# Patient Record
Sex: FEMALE | Race: WHITE | NOT HISPANIC OR LATINO | Employment: FULL TIME | URBAN - METROPOLITAN AREA
[De-identification: names, ages, dates, MRNs, and addresses within clinical notes are randomized per-mention and may not be internally consistent; named-entity substitution may affect disease eponyms.]

---

## 2019-06-11 ENCOUNTER — OCCMED (OUTPATIENT)
Dept: LAB | Facility: CLINIC | Age: 44
End: 2019-06-11

## 2019-06-11 DIAGNOSIS — Z02.1 PHYSICAL EXAM, PRE-EMPLOYMENT: Primary | ICD-10-CM

## 2019-06-11 LAB — RUBV IGG SERPL IA-ACNC: 82.6 IU/ML

## 2019-06-11 PROCEDURE — 86735 MUMPS ANTIBODY: CPT

## 2019-06-11 PROCEDURE — 86765 RUBEOLA ANTIBODY: CPT

## 2019-06-11 PROCEDURE — 86787 VARICELLA-ZOSTER ANTIBODY: CPT

## 2019-06-11 PROCEDURE — 86706 HEP B SURFACE ANTIBODY: CPT

## 2019-06-11 PROCEDURE — 86480 TB TEST CELL IMMUN MEASURE: CPT

## 2019-06-11 PROCEDURE — 36415 COLL VENOUS BLD VENIPUNCTURE: CPT

## 2019-06-11 PROCEDURE — 86762 RUBELLA ANTIBODY: CPT

## 2019-06-12 LAB — HBV SURFACE AB SER-ACNC: 711.68 MIU/ML

## 2019-06-13 LAB
GAMMA INTERFERON BACKGROUND BLD IA-ACNC: 0.04 IU/ML
M TB IFN-G BLD-IMP: NEGATIVE
M TB IFN-G CD4+ BCKGRND COR BLD-ACNC: 0.01 IU/ML
M TB IFN-G CD4+ BCKGRND COR BLD-ACNC: 0.02 IU/ML
MEV IGG SER QL: NORMAL
MITOGEN IGNF BCKGRD COR BLD-ACNC: >10 IU/ML
MUV IGG SER QL: NORMAL
VZV IGG SER IA-ACNC: NORMAL

## 2020-09-02 ENCOUNTER — TRANSCRIBE ORDERS (OUTPATIENT)
Dept: ADMINISTRATIVE | Facility: HOSPITAL | Age: 45
End: 2020-09-02

## 2020-09-02 DIAGNOSIS — M54.2 NECKACHE: ICD-10-CM

## 2020-09-02 DIAGNOSIS — M54.2 CERVICALGIA: ICD-10-CM

## 2020-09-02 DIAGNOSIS — R20.2 PARESTHESIA: Primary | ICD-10-CM

## 2020-09-04 ENCOUNTER — HOSPITAL ENCOUNTER (OUTPATIENT)
Dept: NEUROLOGY | Facility: CLINIC | Age: 45
Discharge: HOME | End: 2020-09-04
Payer: COMMERCIAL

## 2020-09-04 ENCOUNTER — HOSPITAL ENCOUNTER (OUTPATIENT)
Dept: RADIOLOGY | Facility: HOSPITAL | Age: 45
Discharge: HOME | End: 2020-09-04
Payer: COMMERCIAL

## 2020-09-04 DIAGNOSIS — M54.2 CERVICALGIA: ICD-10-CM

## 2020-09-04 DIAGNOSIS — R20.2 PARESTHESIA: ICD-10-CM

## 2020-09-04 DIAGNOSIS — M54.2 NECKACHE: ICD-10-CM

## 2020-09-04 PROCEDURE — 95909 NRV CNDJ TST 5-6 STUDIES: CPT | Performed by: PSYCHIATRY & NEUROLOGY

## 2020-09-04 PROCEDURE — 72040 X-RAY EXAM NECK SPINE 2-3 VW: CPT

## 2020-09-04 PROCEDURE — 95886 MUSC TEST DONE W/N TEST COMP: CPT | Performed by: PSYCHIATRY & NEUROLOGY

## 2020-10-16 ENCOUNTER — LAB (OUTPATIENT)
Dept: LAB | Facility: HOSPITAL | Age: 45
End: 2020-10-16
Payer: COMMERCIAL

## 2020-10-16 ENCOUNTER — TRANSCRIBE ORDERS (OUTPATIENT)
Dept: ADMINISTRATIVE | Facility: HOSPITAL | Age: 45
End: 2020-10-16

## 2020-10-16 DIAGNOSIS — E55.9 VITAMIN D DEFICIENCY: ICD-10-CM

## 2020-10-16 DIAGNOSIS — Z13.29 SCREENING FOR HYPOTHYROIDISM: ICD-10-CM

## 2020-10-16 DIAGNOSIS — Z13.220 SCREENING CHOLESTEROL LEVEL: ICD-10-CM

## 2020-10-16 DIAGNOSIS — I10 ESSENTIAL HYPERTENSION: Primary | ICD-10-CM

## 2020-10-16 DIAGNOSIS — I10 ESSENTIAL HYPERTENSION: ICD-10-CM

## 2020-10-16 DIAGNOSIS — Z13.6 SCREENING FOR HYPERTENSION: ICD-10-CM

## 2020-10-16 LAB
25(OH)D3 SERPL-MCNC: 19.6 NG/ML (ref 30–100)
ALBUMIN SERPL BCP-MCNC: 3.5 G/DL (ref 3.5–5)
ALP SERPL-CCNC: 83 U/L (ref 46–116)
ALT SERPL W P-5'-P-CCNC: 41 U/L (ref 12–78)
ANION GAP SERPL CALCULATED.3IONS-SCNC: 8 MMOL/L (ref 4–13)
AST SERPL W P-5'-P-CCNC: 20 U/L (ref 5–45)
BASOPHILS # BLD AUTO: 0.08 THOUSANDS/ΜL (ref 0–0.1)
BASOPHILS NFR BLD AUTO: 1 % (ref 0–1)
BILIRUB SERPL-MCNC: 0.46 MG/DL (ref 0.2–1)
BUN SERPL-MCNC: 18 MG/DL (ref 5–25)
CALCIUM SERPL-MCNC: 9.1 MG/DL (ref 8.3–10.1)
CHLORIDE SERPL-SCNC: 103 MMOL/L (ref 100–108)
CHOLEST SERPL-MCNC: 209 MG/DL (ref 50–200)
CO2 SERPL-SCNC: 27 MMOL/L (ref 21–32)
CREAT SERPL-MCNC: 0.61 MG/DL (ref 0.6–1.3)
EOSINOPHIL # BLD AUTO: 0.08 THOUSAND/ΜL (ref 0–0.61)
EOSINOPHIL NFR BLD AUTO: 1 % (ref 0–6)
ERYTHROCYTE [DISTWIDTH] IN BLOOD BY AUTOMATED COUNT: 12.3 % (ref 11.6–15.1)
GFR SERPL CREATININE-BSD FRML MDRD: 110 ML/MIN/1.73SQ M
GLUCOSE P FAST SERPL-MCNC: 163 MG/DL (ref 65–99)
HCT VFR BLD AUTO: 41.8 % (ref 34.8–46.1)
HDLC SERPL-MCNC: 61 MG/DL
HGB BLD-MCNC: 13.3 G/DL (ref 11.5–15.4)
IMM GRANULOCYTES # BLD AUTO: 0.04 THOUSAND/UL (ref 0–0.2)
IMM GRANULOCYTES NFR BLD AUTO: 1 % (ref 0–2)
LDLC SERPL CALC-MCNC: 120 MG/DL (ref 0–100)
LYMPHOCYTES # BLD AUTO: 2.13 THOUSANDS/ΜL (ref 0.6–4.47)
LYMPHOCYTES NFR BLD AUTO: 25 % (ref 14–44)
MCH RBC QN AUTO: 27.8 PG (ref 26.8–34.3)
MCHC RBC AUTO-ENTMCNC: 31.8 G/DL (ref 31.4–37.4)
MCV RBC AUTO: 87 FL (ref 82–98)
MONOCYTES # BLD AUTO: 0.63 THOUSAND/ΜL (ref 0.17–1.22)
MONOCYTES NFR BLD AUTO: 8 % (ref 4–12)
NEUTROPHILS # BLD AUTO: 5.41 THOUSANDS/ΜL (ref 1.85–7.62)
NEUTS SEG NFR BLD AUTO: 64 % (ref 43–75)
NONHDLC SERPL-MCNC: 148 MG/DL
NRBC BLD AUTO-RTO: 0 /100 WBCS
PLATELET # BLD AUTO: 287 THOUSANDS/UL (ref 149–390)
PMV BLD AUTO: 11.1 FL (ref 8.9–12.7)
POTASSIUM SERPL-SCNC: 4.1 MMOL/L (ref 3.5–5.3)
PROT SERPL-MCNC: 7.5 G/DL (ref 6.4–8.2)
RBC # BLD AUTO: 4.78 MILLION/UL (ref 3.81–5.12)
SODIUM SERPL-SCNC: 138 MMOL/L (ref 136–145)
TRIGL SERPL-MCNC: 139 MG/DL
TSH SERPL DL<=0.05 MIU/L-ACNC: 1.07 UIU/ML (ref 0.36–3.74)
WBC # BLD AUTO: 8.37 THOUSAND/UL (ref 4.31–10.16)

## 2020-10-16 PROCEDURE — 85025 COMPLETE CBC W/AUTO DIFF WBC: CPT

## 2020-10-16 PROCEDURE — 80061 LIPID PANEL: CPT

## 2020-10-16 PROCEDURE — 80053 COMPREHEN METABOLIC PANEL: CPT

## 2020-10-16 PROCEDURE — 36415 COLL VENOUS BLD VENIPUNCTURE: CPT

## 2020-10-16 PROCEDURE — 82306 VITAMIN D 25 HYDROXY: CPT

## 2020-10-16 PROCEDURE — 84443 ASSAY THYROID STIM HORMONE: CPT

## 2020-12-28 ENCOUNTER — IMMUNIZATIONS (OUTPATIENT)
Dept: FAMILY MEDICINE CLINIC | Facility: HOSPITAL | Age: 45
End: 2020-12-28
Payer: COMMERCIAL

## 2020-12-28 DIAGNOSIS — Z23 ENCOUNTER FOR IMMUNIZATION: ICD-10-CM

## 2020-12-28 PROCEDURE — 0011A SARS-COV-2 / COVID-19 MRNA VACCINE (MODERNA) 100 MCG: CPT

## 2020-12-28 PROCEDURE — 91301 SARS-COV-2 / COVID-19 MRNA VACCINE (MODERNA) 100 MCG: CPT

## 2021-01-26 ENCOUNTER — IMMUNIZATIONS (OUTPATIENT)
Dept: FAMILY MEDICINE CLINIC | Facility: HOSPITAL | Age: 46
End: 2021-01-26

## 2021-01-26 DIAGNOSIS — Z23 ENCOUNTER FOR IMMUNIZATION: Primary | ICD-10-CM

## 2021-01-26 PROCEDURE — 0012A SARS-COV-2 / COVID-19 MRNA VACCINE (MODERNA) 100 MCG: CPT

## 2021-01-26 PROCEDURE — 91301 SARS-COV-2 / COVID-19 MRNA VACCINE (MODERNA) 100 MCG: CPT

## 2021-09-20 ENCOUNTER — APPOINTMENT (OUTPATIENT)
Dept: LAB | Facility: HOSPITAL | Age: 46
End: 2021-09-20
Payer: COMMERCIAL

## 2021-09-20 DIAGNOSIS — I10 ESSENTIAL HYPERTENSION, MALIGNANT: ICD-10-CM

## 2021-09-20 DIAGNOSIS — R73.9 BLOOD GLUCOSE ELEVATED: ICD-10-CM

## 2021-09-20 DIAGNOSIS — E55.9 AVITAMINOSIS D: ICD-10-CM

## 2021-09-20 DIAGNOSIS — E78.5 HYPERLIPIDEMIA, UNSPECIFIED HYPERLIPIDEMIA TYPE: ICD-10-CM

## 2021-09-20 DIAGNOSIS — R73.01 IMPAIRED FASTING GLUCOSE: ICD-10-CM

## 2021-09-20 LAB
25(OH)D3 SERPL-MCNC: 29.8 NG/ML (ref 30–100)
ALBUMIN SERPL BCP-MCNC: 3.5 G/DL (ref 3.5–5)
ALP SERPL-CCNC: 78 U/L (ref 46–116)
ALT SERPL W P-5'-P-CCNC: 44 U/L (ref 12–78)
ANION GAP SERPL CALCULATED.3IONS-SCNC: 12 MMOL/L (ref 4–13)
AST SERPL W P-5'-P-CCNC: 33 U/L (ref 5–45)
BASOPHILS # BLD AUTO: 0.06 THOUSANDS/ΜL (ref 0–0.1)
BASOPHILS NFR BLD AUTO: 1 % (ref 0–1)
BILIRUB SERPL-MCNC: 0.39 MG/DL (ref 0.2–1)
BUN SERPL-MCNC: 17 MG/DL (ref 5–25)
CALCIUM SERPL-MCNC: 8.6 MG/DL (ref 8.3–10.1)
CHLORIDE SERPL-SCNC: 103 MMOL/L (ref 100–108)
CHOLEST SERPL-MCNC: 217 MG/DL (ref 50–200)
CO2 SERPL-SCNC: 23 MMOL/L (ref 21–32)
CREAT SERPL-MCNC: 0.62 MG/DL (ref 0.6–1.3)
EOSINOPHIL # BLD AUTO: 0.05 THOUSAND/ΜL (ref 0–0.61)
EOSINOPHIL NFR BLD AUTO: 1 % (ref 0–6)
ERYTHROCYTE [DISTWIDTH] IN BLOOD BY AUTOMATED COUNT: 12.6 % (ref 11.6–15.1)
EST. AVERAGE GLUCOSE BLD GHB EST-MCNC: 157 MG/DL
GFR SERPL CREATININE-BSD FRML MDRD: 108 ML/MIN/1.73SQ M
GLUCOSE P FAST SERPL-MCNC: 177 MG/DL (ref 65–99)
HBA1C MFR BLD: 7.1 %
HCT VFR BLD AUTO: 41.6 % (ref 34.8–46.1)
HDLC SERPL-MCNC: 55 MG/DL
HGB BLD-MCNC: 13.8 G/DL (ref 11.5–15.4)
IMM GRANULOCYTES # BLD AUTO: 0.01 THOUSAND/UL (ref 0–0.2)
IMM GRANULOCYTES NFR BLD AUTO: 0 % (ref 0–2)
LDLC SERPL CALC-MCNC: 135 MG/DL (ref 0–100)
LYMPHOCYTES # BLD AUTO: 1.81 THOUSANDS/ΜL (ref 0.6–4.47)
LYMPHOCYTES NFR BLD AUTO: 25 % (ref 14–44)
MCH RBC QN AUTO: 28.4 PG (ref 26.8–34.3)
MCHC RBC AUTO-ENTMCNC: 33.2 G/DL (ref 31.4–37.4)
MCV RBC AUTO: 86 FL (ref 82–98)
MONOCYTES # BLD AUTO: 0.52 THOUSAND/ΜL (ref 0.17–1.22)
MONOCYTES NFR BLD AUTO: 7 % (ref 4–12)
NEUTROPHILS # BLD AUTO: 4.69 THOUSANDS/ΜL (ref 1.85–7.62)
NEUTS SEG NFR BLD AUTO: 66 % (ref 43–75)
NONHDLC SERPL-MCNC: 162 MG/DL
NRBC BLD AUTO-RTO: 0 /100 WBCS
PLATELET # BLD AUTO: 257 THOUSANDS/UL (ref 149–390)
PMV BLD AUTO: 11.5 FL (ref 8.9–12.7)
POTASSIUM SERPL-SCNC: 4.1 MMOL/L (ref 3.5–5.3)
PROT SERPL-MCNC: 6.9 G/DL (ref 6.4–8.2)
RBC # BLD AUTO: 4.86 MILLION/UL (ref 3.81–5.12)
SODIUM SERPL-SCNC: 138 MMOL/L (ref 136–145)
TRIGL SERPL-MCNC: 134 MG/DL
WBC # BLD AUTO: 7.14 THOUSAND/UL (ref 4.31–10.16)

## 2021-09-20 PROCEDURE — 83036 HEMOGLOBIN GLYCOSYLATED A1C: CPT

## 2021-09-20 PROCEDURE — 80053 COMPREHEN METABOLIC PANEL: CPT

## 2021-09-20 PROCEDURE — 85025 COMPLETE CBC W/AUTO DIFF WBC: CPT

## 2021-09-20 PROCEDURE — 80061 LIPID PANEL: CPT

## 2021-09-20 PROCEDURE — 82306 VITAMIN D 25 HYDROXY: CPT

## 2021-09-20 PROCEDURE — 36415 COLL VENOUS BLD VENIPUNCTURE: CPT

## 2021-09-22 ENCOUNTER — HOSPITAL ENCOUNTER (OUTPATIENT)
Dept: CT IMAGING | Facility: HOSPITAL | Age: 46
Discharge: HOME | End: 2021-09-22
Payer: COMMERCIAL

## 2021-09-22 DIAGNOSIS — R91.1 SOLITARY PULMONARY NODULE: ICD-10-CM

## 2021-09-22 PROCEDURE — 71250 CT THORAX DX C-: CPT

## 2021-09-22 PROCEDURE — G1004 CDSM NDSC: HCPCS

## 2021-11-16 ENCOUNTER — IMMUNIZATIONS (OUTPATIENT)
Dept: FAMILY MEDICINE CLINIC | Facility: HOSPITAL | Age: 46
End: 2021-11-16

## 2021-11-16 DIAGNOSIS — Z23 ENCOUNTER FOR IMMUNIZATION: Primary | ICD-10-CM

## 2021-11-16 PROCEDURE — 91306 COVID-19 MODERNA VACC 0.25 ML BOOSTER: CPT

## 2021-11-16 PROCEDURE — 0064A COVID-19 MODERNA VACC 0.25 ML BOOSTER: CPT

## 2021-11-23 ENCOUNTER — OFFICE VISIT (OUTPATIENT)
Dept: PULMONOLOGY | Facility: CLINIC | Age: 46
End: 2021-11-23
Payer: COMMERCIAL

## 2021-11-23 VITALS
OXYGEN SATURATION: 96 % | BODY MASS INDEX: 39.09 KG/M2 | SYSTOLIC BLOOD PRESSURE: 124 MMHG | HEIGHT: 64 IN | WEIGHT: 229 LBS | HEART RATE: 84 BPM | DIASTOLIC BLOOD PRESSURE: 82 MMHG

## 2021-11-23 DIAGNOSIS — E66.9 OBESITY (BMI 30-39.9): ICD-10-CM

## 2021-11-23 DIAGNOSIS — R91.8 PULMONARY NODULES: Primary | ICD-10-CM

## 2021-11-23 DIAGNOSIS — G47.33 OSA (OBSTRUCTIVE SLEEP APNEA): ICD-10-CM

## 2021-11-23 PROCEDURE — 99244 OFF/OP CNSLTJ NEW/EST MOD 40: CPT | Performed by: INTERNAL MEDICINE

## 2021-11-23 RX ORDER — CITALOPRAM 20 MG/1
TABLET ORAL
COMMUNITY
Start: 2021-11-01

## 2021-11-23 RX ORDER — ESOMEPRAZOLE MAGNESIUM 40 MG/1
CAPSULE, DELAYED RELEASE ORAL
COMMUNITY
Start: 2021-09-07

## 2021-11-23 RX ORDER — LORAZEPAM 0.5 MG/1
TABLET ORAL
COMMUNITY
Start: 2021-09-27

## 2021-11-23 RX ORDER — LEVONORGESTREL AND ETHINYL ESTRADIOL 0.15-0.03
KIT ORAL
COMMUNITY
Start: 2021-11-01

## 2021-11-23 RX ORDER — CETIRIZINE HYDROCHLORIDE 10 MG/1
10 TABLET ORAL
COMMUNITY

## 2022-09-19 ENCOUNTER — OFFICE VISIT (OUTPATIENT)
Dept: BARIATRICS | Facility: CLINIC | Age: 47
End: 2022-09-19
Payer: COMMERCIAL

## 2022-09-19 VITALS
HEIGHT: 64 IN | HEART RATE: 90 BPM | SYSTOLIC BLOOD PRESSURE: 122 MMHG | DIASTOLIC BLOOD PRESSURE: 78 MMHG | WEIGHT: 225 LBS | BODY MASS INDEX: 38.41 KG/M2 | OXYGEN SATURATION: 98 % | TEMPERATURE: 97.3 F

## 2022-09-19 DIAGNOSIS — E66.9 DIABETES MELLITUS TYPE 2 IN OBESE (HCC): ICD-10-CM

## 2022-09-19 DIAGNOSIS — E78.00 ELEVATED CHOLESTEROL: ICD-10-CM

## 2022-09-19 DIAGNOSIS — E66.01 SEVERE OBESITY (BMI 35.0-39.9) WITH COMORBIDITY (HCC): Primary | ICD-10-CM

## 2022-09-19 DIAGNOSIS — E55.9 VITAMIN D DEFICIENCY: ICD-10-CM

## 2022-09-19 DIAGNOSIS — E11.69 DIABETES MELLITUS TYPE 2 IN OBESE (HCC): ICD-10-CM

## 2022-09-19 PROCEDURE — 99244 OFF/OP CNSLTJ NEW/EST MOD 40: CPT | Performed by: PHYSICIAN ASSISTANT

## 2022-09-19 NOTE — PATIENT INSTRUCTIONS
Goals: Food log (ie ) www myfitnesspal com,sparkpeople  com,loseit com,calorieking  com,etc  baritastic  No sugary beverages  At least 64oz of water daily  Increase physical activity by 10 minutes daily   Gradually increase physical activity to a goal of 5 days per week for 30 minutes of MODERATE intensity PLUS 2 days per week of FULL BODY resistance training  5-10 servings of fruits and vegetables per day and 25-35 grams of dietary fiber per day, gradually increasing  Insight timer minoo  Recommend checking lab/EKG coverage before having labs drawn

## 2022-09-19 NOTE — ASSESSMENT & PLAN NOTE
-Discussed options of HealthyCORE-Intensive Lifestyle Intervention Program, Very Low Calorie Diet-VLCD, Conservative Program, Maicol-En-Y Gastric Bypass and Vertical Sleeve Gastrectomy and the role of weight loss medications   -Not interested in bariatric surgery  -Initial weight loss goal of 5-10% weight loss for improved health  -Screening labs: update  -Patient is interested in pursuing Very Low Calorie Diet-VLCD     Contraindications: no  Labs ordered: yes  HTN meds addressed: no  DM2 meds addressed: no  VLCD time restriction based on BMI: no  LMP/OCP: on OCP  Sample meal replacements provided

## 2022-09-19 NOTE — PROGRESS NOTES
Assessment/Plan:    Severe obesity (BMI 35 0-39  9) with comorbidity ([facility] )  -Discussed options of HealthyCORE-Intensive Lifestyle Intervention Program, Very Low Calorie Diet-VLCD, Conservative Program, Maicol-En-Y Gastric Bypass and Vertical Sleeve Gastrectomy and the role of weight loss medications   -Not interested in bariatric surgery  -Initial weight loss goal of 5-10% weight loss for improved health  -Screening labs: update  -Patient is interested in pursuing Very Low Calorie Diet-VLCD     Contraindications: no  Labs ordered: yes  HTN meds addressed: no  DM2 meds addressed: no  VLCD time restriction based on BMI: no  LMP/OCP: on OCP  Sample meal replacements provided    Follow up for VLCD  Work on increasing water    Goals:  Food log (ie ) www myfitnesspal com,sparkpeople  com,loseit com,calorieking  com,etc  baritastic  No sugary beverages  At least 64oz of water daily  Increase physical activity by 10 minutes daily  Gradually increase physical activity to a goal of 5 days per week for 30 minutes of MODERATE intensity PLUS 2 days per week of FULL BODY resistance training  5-10 servings of fruits and vegetables per day and 25-35 grams of dietary fiber per day, gradually increasing  Insight timer minoo  Recommend checking lab/EKG coverage before having labs drawn    Diagnoses and all orders for this visit:    Severe obesity (BMI 35 0-39  9) with comorbidity ([facility] )  Comments:  will check CBC as usually ordered with annual labs- she will f/u with PCP if abnormal  Orders:  -     Comprehensive metabolic panel; Future  -     Hemoglobin A1C; Future  -     Vitamin D 25 hydroxy; Future  -     TSH, 3rd generation with Free T4 reflex; Future  -     CBC and differential; Future  -     Magnesium; Future  -     ECG 12 lead; Future    Vitamin D deficiency  -     Comprehensive metabolic panel; Future  -     Hemoglobin A1C; Future  -     Vitamin D 25 hydroxy; Future  -     TSH, 3rd generation with Free T4 reflex;  Future  -     CBC and differential; Future  -     Magnesium; Future  -     ECG 12 lead; Future    Diabetes mellitus type 2 in obese [facility])  -     Comprehensive metabolic panel; Future  -     Hemoglobin A1C; Future  -     Vitamin D 25 hydroxy; Future  -     TSH, 3rd generation with Free T4 reflex; Future  -     CBC and differential; Future  -     Magnesium; Future  -     ECG 12 lead; Future    Elevated cholesterol  -     Comprehensive metabolic panel; Future  -     Hemoglobin A1C; Future  -     Vitamin D 25 hydroxy; Future  -     TSH, 3rd generation with Free T4 reflex; Future  -     CBC and differential; Future  -     Magnesium; Future  -     ECG 12 lead; Future        Subjective:   Chief Complaint   Patient presents with    Consult     S/B already uses cpap machine  Patient ID: Tayo Marie  is a 51 y o  female with excess weight/obesity here to pursue weight management  Past Medical History:   Diagnosis Date    Acid reflux     Depression     Sleep apnea      HPI:  Obesity/Excess Weight:  Severity: Severe  Onset:  Over ten years    Modifiers: Diet and Exercise and [employer]- worked until stopped   Prescribed meds, but never started: Wellbutrin/naltrexone, Phentermine  Contributing factors: Insufficient Physical Activity and Depression  Associated symptoms: comorbid conditions and decreased self esteem    Goals: 155-175  Hydration: not reaching hydration goals (1 bottle of water per day), sometimes SF ice tea; 2 cups of coffee with creamer  Alcohol: once a month or less  Exercise: denies  Occupation: RN case manager for network    The following portions of the patient's history were reviewed and updated as appropriate: allergies, current medications, past family history, past medical history, past social history, past surgical history and problem list     Review of Systems   Constitutional: Negative for chills and fever  HENT: Negative for sore throat  Respiratory: Negative for cough and shortness of breath  Cardiovascular: Negative for chest pain and palpitations  Gastrointestinal: Negative for constipation and diarrhea  Genitourinary: Negative for dysuria  Musculoskeletal: Negative for arthralgias  Skin: Negative for rash  Neurological: Positive for headaches (tension headaches; recommend eval if sx persist or worsen; sleep also an issue- may be perimenopausal related)  Psychiatric/Behavioral:        +depression- doing well with Celexa; Denies SI/HI     Objective:    /78 (BP Location: Left arm, Cuff Size: Large)   Pulse 90   Temp (!) 97 3 °F (36 3 °C) (Temporal)   Ht 5' 4" (1 626 m)   Wt 102 kg (225 lb)   SpO2 98%   BMI 38 62 kg/m²     Physical Exam  Vitals and nursing note reviewed  Constitutional   General appearance: Abnormal   well developed and obese  Eyes No conjunctival pallor  Ears, Nose, Mouth, and Throat Oral mucosa moist    Pulmonary   Respiratory effort: No increased work of breathing or signs of respiratory distress  Auscultation of lungs: Clear to auscultation, equal breath sounds bilaterally, no wheezes, no rales, no rhonci  Cardiovascular   Auscultation of heart: Normal rate and rhythm, normal S1 and S2, without murmurs  Examination of extremities for edema and/or varicosities: Normal   no edema  Abdomen   Abdomen: Abnormal   The abdomen was obese  Bowel sounds were normal  The abdomen was soft and nontender     Musculoskeletal   Gait and station: Normal     Psychiatric   Orientation to person, place and time: Normal     Affect: appropriate

## 2022-09-21 ENCOUNTER — APPOINTMENT (OUTPATIENT)
Dept: LAB | Facility: HOSPITAL | Age: 47
End: 2022-09-21
Payer: COMMERCIAL

## 2022-09-21 DIAGNOSIS — E66.01 SEVERE OBESITY (BMI 35.0-39.9) WITH COMORBIDITY (HCC): ICD-10-CM

## 2022-09-21 DIAGNOSIS — E66.9 DIABETES MELLITUS TYPE 2 IN OBESE (HCC): ICD-10-CM

## 2022-09-21 DIAGNOSIS — E78.00 ELEVATED CHOLESTEROL: ICD-10-CM

## 2022-09-21 DIAGNOSIS — E11.69 DIABETES MELLITUS TYPE 2 IN OBESE (HCC): ICD-10-CM

## 2022-09-21 DIAGNOSIS — E55.9 VITAMIN D DEFICIENCY: ICD-10-CM

## 2022-09-21 LAB
25(OH)D3 SERPL-MCNC: 22.3 NG/ML (ref 30–100)
ALBUMIN SERPL BCP-MCNC: 3.5 G/DL (ref 3.5–5)
ALP SERPL-CCNC: 77 U/L (ref 46–116)
ALT SERPL W P-5'-P-CCNC: 32 U/L (ref 12–78)
ANION GAP SERPL CALCULATED.3IONS-SCNC: 8 MMOL/L (ref 4–13)
AST SERPL W P-5'-P-CCNC: 29 U/L (ref 5–45)
BASOPHILS # BLD AUTO: 0.07 THOUSANDS/ΜL (ref 0–0.1)
BASOPHILS NFR BLD AUTO: 1 % (ref 0–1)
BILIRUB SERPL-MCNC: 0.33 MG/DL (ref 0.2–1)
BUN SERPL-MCNC: 21 MG/DL (ref 5–25)
CALCIUM SERPL-MCNC: 9 MG/DL (ref 8.3–10.1)
CHLORIDE SERPL-SCNC: 102 MMOL/L (ref 96–108)
CO2 SERPL-SCNC: 27 MMOL/L (ref 21–32)
CREAT SERPL-MCNC: 0.68 MG/DL (ref 0.6–1.3)
EOSINOPHIL # BLD AUTO: 0.09 THOUSAND/ΜL (ref 0–0.61)
EOSINOPHIL NFR BLD AUTO: 1 % (ref 0–6)
ERYTHROCYTE [DISTWIDTH] IN BLOOD BY AUTOMATED COUNT: 12.7 % (ref 11.6–15.1)
EST. AVERAGE GLUCOSE BLD GHB EST-MCNC: 166 MG/DL
GFR SERPL CREATININE-BSD FRML MDRD: 104 ML/MIN/1.73SQ M
GLUCOSE P FAST SERPL-MCNC: 188 MG/DL (ref 65–99)
HBA1C MFR BLD: 7.4 %
HCT VFR BLD AUTO: 41.5 % (ref 34.8–46.1)
HGB BLD-MCNC: 13.7 G/DL (ref 11.5–15.4)
IMM GRANULOCYTES # BLD AUTO: 0.02 THOUSAND/UL (ref 0–0.2)
IMM GRANULOCYTES NFR BLD AUTO: 0 % (ref 0–2)
LYMPHOCYTES # BLD AUTO: 2.19 THOUSANDS/ΜL (ref 0.6–4.47)
LYMPHOCYTES NFR BLD AUTO: 26 % (ref 14–44)
MAGNESIUM SERPL-MCNC: 1.7 MG/DL (ref 1.6–2.6)
MCH RBC QN AUTO: 28 PG (ref 26.8–34.3)
MCHC RBC AUTO-ENTMCNC: 33 G/DL (ref 31.4–37.4)
MCV RBC AUTO: 85 FL (ref 82–98)
MONOCYTES # BLD AUTO: 0.56 THOUSAND/ΜL (ref 0.17–1.22)
MONOCYTES NFR BLD AUTO: 7 % (ref 4–12)
NEUTROPHILS # BLD AUTO: 5.55 THOUSANDS/ΜL (ref 1.85–7.62)
NEUTS SEG NFR BLD AUTO: 65 % (ref 43–75)
NRBC BLD AUTO-RTO: 0 /100 WBCS
PLATELET # BLD AUTO: 278 THOUSANDS/UL (ref 149–390)
PMV BLD AUTO: 11.3 FL (ref 8.9–12.7)
POTASSIUM SERPL-SCNC: 3.9 MMOL/L (ref 3.5–5.3)
PROT SERPL-MCNC: 7.7 G/DL (ref 6.4–8.4)
RBC # BLD AUTO: 4.9 MILLION/UL (ref 3.81–5.12)
SODIUM SERPL-SCNC: 137 MMOL/L (ref 135–147)
TSH SERPL DL<=0.05 MIU/L-ACNC: 3 UIU/ML (ref 0.45–4.5)
WBC # BLD AUTO: 8.48 THOUSAND/UL (ref 4.31–10.16)

## 2022-09-21 PROCEDURE — 80053 COMPREHEN METABOLIC PANEL: CPT

## 2022-09-21 PROCEDURE — 84443 ASSAY THYROID STIM HORMONE: CPT

## 2022-09-21 PROCEDURE — 83735 ASSAY OF MAGNESIUM: CPT

## 2022-09-21 PROCEDURE — 82306 VITAMIN D 25 HYDROXY: CPT

## 2022-09-21 PROCEDURE — 85025 COMPLETE CBC W/AUTO DIFF WBC: CPT

## 2022-09-21 PROCEDURE — 36415 COLL VENOUS BLD VENIPUNCTURE: CPT

## 2022-09-21 PROCEDURE — 83036 HEMOGLOBIN GLYCOSYLATED A1C: CPT

## 2022-09-26 DIAGNOSIS — E66.01 SEVERE OBESITY (BMI 35.0-39.9) WITH COMORBIDITY (HCC): ICD-10-CM

## 2022-09-26 DIAGNOSIS — E55.9 VITAMIN D DEFICIENCY: Primary | ICD-10-CM

## 2022-09-26 RX ORDER — ERGOCALCIFEROL 1.25 MG/1
50000 CAPSULE ORAL WEEKLY
Qty: 8 CAPSULE | Refills: 0 | Status: SHIPPED | OUTPATIENT
Start: 2022-09-26

## 2022-10-20 ENCOUNTER — OFFICE VISIT (OUTPATIENT)
Dept: FAMILY MEDICINE CLINIC | Facility: CLINIC | Age: 47
End: 2022-10-20
Payer: COMMERCIAL

## 2022-10-20 VITALS
TEMPERATURE: 98.3 F | HEART RATE: 92 BPM | BODY MASS INDEX: 38.47 KG/M2 | SYSTOLIC BLOOD PRESSURE: 138 MMHG | DIASTOLIC BLOOD PRESSURE: 90 MMHG | OXYGEN SATURATION: 98 % | HEIGHT: 64 IN | WEIGHT: 225.31 LBS

## 2022-10-20 DIAGNOSIS — F32.5 MAJOR DEPRESSIVE DISORDER WITH SINGLE EPISODE, IN FULL REMISSION (HCC): ICD-10-CM

## 2022-10-20 DIAGNOSIS — F51.01 PRIMARY INSOMNIA: ICD-10-CM

## 2022-10-20 DIAGNOSIS — M54.2 MUSCLE PAIN, CERVICAL: ICD-10-CM

## 2022-10-20 DIAGNOSIS — M70.61 TROCHANTERIC BURSITIS, RIGHT HIP: ICD-10-CM

## 2022-10-20 DIAGNOSIS — R03.0 ELEVATED BLOOD PRESSURE READING: ICD-10-CM

## 2022-10-20 DIAGNOSIS — E11.9 DM TYPE 2, GOAL HBA1C < 7% (HCC): Primary | ICD-10-CM

## 2022-10-20 PROCEDURE — 99205 OFFICE O/P NEW HI 60 MIN: CPT | Performed by: PHYSICIAN ASSISTANT

## 2022-10-20 RX ORDER — METFORMIN HYDROCHLORIDE 500 MG/1
500 TABLET, EXTENDED RELEASE ORAL
Qty: 30 TABLET | Refills: 3 | Status: SHIPPED | OUTPATIENT
Start: 2022-10-20 | End: 2023-02-17

## 2022-10-20 NOTE — PROGRESS NOTES
Assessment/Plan:       1  DM type 2, goal HbA1c < 7% ([facility])  -     metFORMIN (GLUCOPHAGE-XR) 500 mg 24 hr tablet; Take 1 tablet (500 mg total) by mouth daily with breakfast  -     HEMOGLOBIN A1C W/ EAG ESTIMATION; Future    2  BMI 38 0-38 9,adult    3  Elevated blood pressure reading    4  Major depressive disorder with single episode, in full remission ([address] )    5  Primary insomnia    6  Muscle pain, cervical    7  Trochanteric bursitis, right hip      This 19-year-old female is establishing care here possible primary care  She works as a  registered nurse at the [employer]   She previously worked as a floor nurse at [employer]   During her for nursing duties, she did have a lot of lifting responsibilities but no actual injury and no history of motor vehicle accident  Patient is complaining of right shoulder pain for several years  Over the last 1 year  , she has had radicular pain going down the right upper extremity  Patient is left-hand dominant  She is also complaining of right hip pain along the lateral hip going into the lateral right thigh  She has no significant injury in that area  Patient would like to lose weight  She has seen in the bariatric specialist and she will be following a very low-calorie diet after she meets with a nutritionist   Patient states that her most recent hemoglobin A1c was 7% which puts her in the type 2 diabetes and she is willing to try metformin extended release once per day  We did discuss the patient's elevated BMI  BMI is above normal  Nutrition recommendations include reducing portion sizes and decreasing overall calorie intake  patient will also continue to have increased physical activity to promote maintaining her basal metabolic rate  A total of 60 minutes was spent rendering care for this patient    This time included review of the patient's electronic medical record, performing the history and physical, reviewing appropriate labs and/or images, developing a treatment and assessment plan, answering patient's questions and concerns, and documenting the patient visit  I will see the patient in 4 months and she will have a hemoglobin A1c prior to that visit  Subjective:      Patient ID: Issac Baker is a 51 y o  female  HPI:  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  She has a prescription to have a mammogram performed from her gynecologist and promises me that she will have this done in the near future  Patient is not having any pain in her chest heart palpitations dizziness or lightheadedness  Her primary complaints are related her right shoulder right neck right upper back and right hip pain  She had no overt injury to any of the sites  She is having radicular pain into the right upper extremity right forearm and right hand area over the last 1 year  Patient also has pain from the right hip into the right lateral thigh but no saddle anesthesia, changes in her bowel or bladder habits, footdrop, muscle weakness or falls  Patient wears glasses for distance  Her blood pressure is elevated today and she said that her blood pressure typically is not on the high range  She is going to keep a track of her blood pressure readings and will send them to me to see if treatment is needed  Neither parent has had hypertension  Patient is compliant with her vitamin-D supplementation as she was found to have a low vitamin-D level at 22 3  She continues to meet with the bariatric specialists and will be meeting with a nutritionist to plan a very low-calorie diet  We talked about the role of medications if she is not able to lose weight  She states that her ultimate goal would be 175 lb that she would love to get to 155   She understands the role of slow weight loss about 1 lb per week to be ideal but she may lose more than this if following a very low-calorie diet       The following portions of the patient's history were reviewed and updated as appropriate: allergies, current medications, past family history, past medical history, past social history, past surgical history, and problem list     Review of Systems  :  Patient denies any recent illness  She does not have pain in her chest when she is moving about  She denies any dyspnea upon exertion  No palpitations  No black or bloody stools  No difficulty passing her water  She continues to have her menses is withdrawal bleeding as she is on oral contraceptive  There is no reason that she cannot continue to take this until the time of her natural menopause which is expected at age 45  Patient is not complaining of any headaches  Vision is stable  She was never told she had any funduscopic abnormalities on her eyes in the past     Objective:      /90 (BP Location: Right arm, Patient Position: Sitting)   Pulse 92   Temp 98 3 °F (36 8 °C) (Tympanic)   Ht 5' 4" (1 626 m)   Wt 102 kg (225 lb 5 oz)   SpO2 98%   BMI 38 67 kg/m²          Physical Exam  Well-developed well-nourished 51y o  year old female who is cooperative with the exam   Patient is alert and oriented x3  Patient is appropriate in answering all questions  HEENT:  Normocephalic  PERRLA  EOMs intact  Funduscopic exam:  She has signs of sober wiring in the arterials on both eyes  This could be a sign of some hypertension or a normal variant  TMs are clear with identification of bony landmarks  No tragus or pinnae tenderness  No pre or posterior auricular adenopathy  Sinuses without tenderness  Throat without hyperemia  Neck:  Supple without adenopathy  Thyroid midline without thyromegaly or bruits  No carotid bruits  Chest symmetric and nontender  Heart regular rate and rhythm  No murmur rubs or gallops  Point of maximum impulse not displaced  Lungs are clear to auscultation  Breathing is nonlabored    Aerating bases well   Abdomen round and soft positive bowel sounds without masses tenderness or organomegaly  Extremities reveal adequate peripheral pulses without peripheral edema  Back exam:  Patient has significant muscle spasm and tightness to her muscles in the trapezius muscles on both sides right and left  This muscle tension is very significant and I think would respond quite well to massage therapy  Examination of the upper extremity muscle strength showed no focal deficits

## 2022-10-20 NOTE — PATIENT INSTRUCTIONS
Patient is here to establish care  Patient is concerned over right shoulder and neck pain without significant injury  The pain is on the right side and she is left-hand dominant  Patient has a lot of muscle spasm in this area and I suggested that massage therapy would be advantageous with this little is 1 massage every 2 weeks I think would contain the amount of spasm that she has  She also has some right trochanteric bursitis and I was able to do a mild stretch today to see if she has any effect  If the bursitis pain with radiation to the right lateral thigh persists, we can send her to physical therapy but will see which she has in response to putting a pillow between her knees for sleeping and doing that nice gentle stretch if she has someone to help her  Patient's blood pressure was slightly elevated today at 138/90  I did notice some silver wiring on her arterials on funduscopic exam   She is going to take her blood pressure and send me the results so that we could start treatment if necessary  She would not need another appointment I could just start her on the medication when I receive those values  Patient also has a hemoglobin A1c of 7 and she is willing to start metformin 500 mg daily for controlling her blood sugar and lowering her gluconeogenesis  We can check the patient in 4 months and do a hemoglobin A1c prior to that next visit  Patient is still having her menstrual cycles and is on oral contraceptives which she can continue to take all the way through menopause  Patient has major depression seems to be doing quite well with citalopram   She does have insomnia and is taking lorazepam at night as needed I think when she gets more active and starts to have some weight loss success that her sleeping will be improved  The sleeping will also be improved if we can get rid of the spasm in her trapezius muscles in her upper back area

## 2022-10-21 ENCOUNTER — OFFICE VISIT (OUTPATIENT)
Dept: LAB | Facility: HOSPITAL | Age: 47
End: 2022-10-21
Payer: COMMERCIAL

## 2022-10-21 DIAGNOSIS — E78.00 ELEVATED CHOLESTEROL: ICD-10-CM

## 2022-10-21 DIAGNOSIS — E66.9 DIABETES MELLITUS TYPE 2 IN OBESE (HCC): ICD-10-CM

## 2022-10-21 DIAGNOSIS — E11.69 DIABETES MELLITUS TYPE 2 IN OBESE (HCC): ICD-10-CM

## 2022-10-21 DIAGNOSIS — E55.9 VITAMIN D DEFICIENCY: ICD-10-CM

## 2022-10-21 DIAGNOSIS — E66.01 SEVERE OBESITY (BMI 35.0-39.9) WITH COMORBIDITY (HCC): ICD-10-CM

## 2022-10-21 LAB
ATRIAL RATE: 84 BPM
P AXIS: 64 DEGREES
PR INTERVAL: 132 MS
QRS AXIS: 52 DEGREES
QRSD INTERVAL: 78 MS
QT INTERVAL: 372 MS
QTC INTERVAL: 439 MS
T WAVE AXIS: 62 DEGREES
VENTRICULAR RATE: 84 BPM

## 2022-10-21 PROCEDURE — 93005 ELECTROCARDIOGRAM TRACING: CPT

## 2022-10-24 ENCOUNTER — TELEPHONE (OUTPATIENT)
Dept: SLEEP CENTER | Facility: CLINIC | Age: 47
End: 2022-10-24

## 2022-10-24 NOTE — TELEPHONE ENCOUNTER
I called and left message for the patient to call the office back regarding her ekg results  ----- Message from Tmimy Lopez PA-C sent at 10/24/2022  7:05 AM EDT -----  Regarding: Results  Please inform pt EKG within acceptable limits  She can be scheduled for VLCD   Thank you    ----- Message -----  From: Yunior Jorge  Sent: 10/21/2022   9:08 AM EDT  To: Timmy Lopez PA-C  Subject: Results                                          Just wanted to let u know i had my ekg done today  Hopefully after u review, i can get set up with the dietitian  Thanks

## 2022-11-02 NOTE — TELEPHONE ENCOUNTER
I called and left message for the patient to call the office back regarding about getting set up for the dietican for VLCD

## 2022-11-17 NOTE — PROGRESS NOTES
Initial RD session for VLCD completed  Kerry weighed 225# 9/19/22, 224# today  Components of diet discussed including ketosis, hydration, possible side effects  She will start VLCD 12/12/22  Set up patient [employer] account today for Kerry  She will order her own product; she is aware she is to order 6 boxes of meal replacements (2 with fiber) and 2 boxes of bars  She has RD contact information in case of questions  Will f/u via 12/15/22

## 2022-11-21 ENCOUNTER — OFFICE VISIT (OUTPATIENT)
Dept: BARIATRICS | Facility: CLINIC | Age: 47
End: 2022-11-21

## 2022-11-21 VITALS
BODY MASS INDEX: 38.31 KG/M2 | WEIGHT: 224.4 LBS | HEART RATE: 99 BPM | OXYGEN SATURATION: 96 % | SYSTOLIC BLOOD PRESSURE: 102 MMHG | TEMPERATURE: 97.5 F | DIASTOLIC BLOOD PRESSURE: 70 MMHG | HEIGHT: 64 IN

## 2022-11-21 DIAGNOSIS — Z78.9 KETOGENIC DIET: ICD-10-CM

## 2022-11-21 DIAGNOSIS — E66.9 DIABETES MELLITUS TYPE 2 IN OBESE (HCC): ICD-10-CM

## 2022-11-21 DIAGNOSIS — E11.69 DIABETES MELLITUS TYPE 2 IN OBESE (HCC): ICD-10-CM

## 2022-11-21 DIAGNOSIS — E66.9 CLASS 2 OBESITY: Primary | ICD-10-CM

## 2022-11-21 DIAGNOSIS — E66.09 CLASS 2 OBESITY DUE TO EXCESS CALORIES WITHOUT SERIOUS COMORBIDITY WITH BODY MASS INDEX (BMI) OF 38.0 TO 38.9 IN ADULT: ICD-10-CM

## 2022-11-21 LAB — SL AMB POCT URINE HCG: NEGATIVE

## 2022-11-21 NOTE — PROGRESS NOTES
Assessment/Plan:    Class 2 obesity  -Patient is pursuing Conservative Program  -Initial weight loss goal of 5-10% weight loss for improved health  -Not interested in bariatric surgery  -Denies hx of CAD, PAD, glaucoma, palpitations or arrhythmia   -Denies glaucoma, kidney stones, nephrolithiasis  -Patient denies personal and family history of MEN2 tumors and medullary thyroid/thyroid carcinoma  Patient denies personal history of pancreatitis  -Previously was on phentermine, had trouble sleeping  -Has interest in AOM, particularly Saxenda, which may be an option following VLCD  -Discussed role of AOM, mechanism of each, possible SE  Discussed transition from VLCD and that this may be a potential option at that time  She will check coverage  -EKG from 10/21/22: Normal sinus rhythm  Normal ECG  No previous ECGs available  -Screening labs: Mg, TSH, vitamin D, CBC, A1c, CMP reviewed from 9/21/22    Contraindications: no  Labs ordered: yes and within acceptable limits to start VLCD  HTN meds addressed: yes, n/a  DM2 meds addressed: hold metformin with VLCD  VLCD time restriction based on BMI: no  LMP/OCP: yes, on OCP, urine HCG negative    Initial: 225  Current: 224 6  Change: -0 4  Goal: 155-175    Follow up in approximately 2 months with Non-Surgical Physician/Advanced Practitioner  Goals:  Food log (ie ) www myfitnesspal com,sparkpeople  com,loseit com,calorieking  com,etc  baritastic  No sugary beverages  At least 64oz of water daily  Check coverage of Wegovy, Saxenda, Contrave, and Qsymia  Next week start over the counter vitamin D 2000 IU daily     Diagnoses and all orders for this visit:    Class 2 obesity  -     POCT urine HCG    Ketogenic diet  -     POCT urine HCG    Diabetes mellitus type 2 in obese [facility])  Comments:  recently diagnosed; hold metformin; c/w lifestyle changes/weight loss   [facility] [place], but will assess following VLCD transition    Body mass index 38 0-38 9, adult Subjective:   Chief Complaint   Patient presents with   • Follow-up     MWM     Patient ID: Vanessa Celis  is a 49 y o  female with excess weight/obesity here to pursue weight management  Patient is pursuing Conservative Program      HPI  The patient presents for MWM follow up  She is scheduled to see RD today for VLCD start  She will be traveling the week of December 3rd  She plans to start VLCD when she returns  She took her last Rx vitamin D this week  OTC instructions provided  She has 5000 IU at home so will take 10819 IU weekly or will take every other day  Metformin was started by her PCP since consult, this will be held for VLCD  She has interest in AOM following VLCD    The following portions of the patient's history were reviewed and updated as appropriate: allergies, current medications, past family history, past medical history, past social history, past surgical history and problem list     Review of Systems   Cardiovascular: Negative for palpitations  Objective:    /70 (BP Location: Left arm, Cuff Size: Large)   Pulse 99   Temp 97 5 °F (36 4 °C) (Temporal)   Ht 5' 4" (1 626 m)   Wt 102 kg (224 lb 6 4 oz)   SpO2 96%   BMI 38 52 kg/m²      Physical Exam  Vitals and nursing note reviewed  Constitutional   General appearance: Abnormal   well developed and obese  Pulmonary   Respiratory effort: No increased work of breathing or signs of respiratory distress  Abdomen   Abdomen: Abnormal   The abdomen was obese      Musculoskeletal   Gait and station: Normal     Psychiatric   Orientation to person, place and time: Normal     Affect: appropriate

## 2022-11-21 NOTE — ASSESSMENT & PLAN NOTE
-Patient is pursuing Conservative Program  -Initial weight loss goal of 5-10% weight loss for improved health  -Not interested in bariatric surgery  -Denies hx of CAD, PAD, glaucoma, palpitations or arrhythmia   -Denies glaucoma, kidney stones, nephrolithiasis  -Patient denies personal and family history of MEN2 tumors and medullary thyroid/thyroid carcinoma  Patient denies personal history of pancreatitis  -Previously was on phentermine, had trouble sleeping  -Has interest in AOM, particularly Saxenda, which may be an option following VLCD  -Discussed role of AOM, mechanism of each, possible SE  Discussed transition from VLCD and that this may be a potential option at that time   She will check coverage  -EKG from 10/21/22: Normal sinus rhythm  Normal ECG  No previous ECGs available  -Screening labs: Mg, TSH, vitamin D, CBC, A1c, CMP reviewed from 9/21/22    Contraindications: no  Labs ordered: yes and within acceptable limits to start VLCD  HTN meds addressed: yes, n/a  DM2 meds addressed: hold metformin with VLCD  VLCD time restriction based on BMI: no  LMP/OCP: yes, on OCP, urine HCG negative    Initial: 225  Current: 224 6  Change: -0 4  Goal: 155-175

## 2022-11-21 NOTE — PATIENT INSTRUCTIONS
Goals: Food log (ie ) www myfitnesspal com,sparkpeople  com,loseit com,[employer]  com,etc  baritastic  No sugary beverages  At least 64oz of water daily    Check coverage of Wegovy, Saxenda, Contrave, and Qsymia  Next week start over the counter vitamin D 2000 IU daily

## 2022-11-30 ENCOUNTER — PATIENT MESSAGE (OUTPATIENT)
Dept: FAMILY MEDICINE CLINIC | Facility: CLINIC | Age: 47
End: 2022-11-30

## 2022-11-30 DIAGNOSIS — K21.9 CHRONIC GERD: Primary | ICD-10-CM

## 2022-11-30 DIAGNOSIS — K21.9 CHRONIC GERD: ICD-10-CM

## 2022-11-30 RX ORDER — ESOMEPRAZOLE MAGNESIUM 40 MG/1
CAPSULE, DELAYED RELEASE ORAL
Qty: 30 CAPSULE | Refills: 3 | Status: CANCELLED | OUTPATIENT
Start: 2022-11-30

## 2022-11-30 RX ORDER — ESOMEPRAZOLE MAGNESIUM 40 MG/1
CAPSULE, DELAYED RELEASE ORAL
Qty: 30 CAPSULE | Refills: 3 | Status: SHIPPED | OUTPATIENT
Start: 2022-11-30 | End: 2022-11-30 | Stop reason: SDUPTHER

## 2022-12-02 DIAGNOSIS — F32.1 CURRENT MODERATE EPISODE OF MAJOR DEPRESSIVE DISORDER, UNSPECIFIED WHETHER RECURRENT (HCC): Primary | ICD-10-CM

## 2022-12-02 RX ORDER — CITALOPRAM 20 MG/1
20 TABLET ORAL DAILY
Qty: 10 TABLET | Refills: 0 | Status: SHIPPED | OUTPATIENT
Start: 2022-12-02 | End: 2022-12-12

## 2023-01-05 DIAGNOSIS — F41.1 GENERALIZED ANXIETY DISORDER: Primary | ICD-10-CM

## 2023-01-05 RX ORDER — LORAZEPAM 0.5 MG/1
0.5 TABLET ORAL 2 TIMES DAILY
Qty: 90 TABLET | Refills: 0 | Status: SHIPPED | OUTPATIENT
Start: 2023-01-05 | End: 2023-04-05

## 2023-01-31 ENCOUNTER — TELEPHONE (OUTPATIENT)
Dept: BARIATRICS | Facility: CLINIC | Age: 48
End: 2023-01-31